# Patient Record
Sex: MALE | Race: WHITE
[De-identification: names, ages, dates, MRNs, and addresses within clinical notes are randomized per-mention and may not be internally consistent; named-entity substitution may affect disease eponyms.]

---

## 2017-04-21 ENCOUNTER — HOSPITAL ENCOUNTER (OUTPATIENT)
Dept: HOSPITAL 17 - PHRSP | Age: 67
End: 2017-04-21
Attending: INTERNAL MEDICINE
Payer: MEDICARE

## 2017-04-21 DIAGNOSIS — R06.89: Primary | ICD-10-CM

## 2017-04-21 PROCEDURE — 94060 EVALUATION OF WHEEZING: CPT

## 2017-04-21 PROCEDURE — 94726 PLETHYSMOGRAPHY LUNG VOLUMES: CPT

## 2017-04-21 PROCEDURE — 94729 DIFFUSING CAPACITY: CPT

## 2017-05-02 NOTE — RSPPFT
DATE OF PROCEDURE:    4/21/17



COMMENTS:   



Spirometry with FVC of 3.4, FEV1 of 2.4, FEV1/FVC ratio at 71%. A non-significant response 
to acutely inhaled bronchodilator. Slow vital capacity is 79% of predicted. TLC is 100%. 
Diffusion capacity is normal.   



IMPRESSION:    

   

1.    Mild airways obstruction.

2.    Non-significant response to acutely inhaled bronchodilator.

3.    No evidence of airways restriction.

4.    Normal diffusion capacity.

## 2017-05-03 ENCOUNTER — HOSPITAL ENCOUNTER (OUTPATIENT)
Dept: HOSPITAL 17 - HSDC | Age: 67
Discharge: HOME | End: 2017-05-03
Attending: INTERNAL MEDICINE
Payer: MEDICARE

## 2017-05-03 VITALS — WEIGHT: 136.47 LBS | HEIGHT: 70 IN | BODY MASS INDEX: 19.54 KG/M2

## 2017-05-03 VITALS
HEART RATE: 79 BPM | DIASTOLIC BLOOD PRESSURE: 68 MMHG | SYSTOLIC BLOOD PRESSURE: 101 MMHG | RESPIRATION RATE: 18 BRPM | OXYGEN SATURATION: 95 % | TEMPERATURE: 98 F

## 2017-05-03 VITALS
HEART RATE: 56 BPM | RESPIRATION RATE: 16 BRPM | DIASTOLIC BLOOD PRESSURE: 75 MMHG | TEMPERATURE: 98.4 F | OXYGEN SATURATION: 96 % | SYSTOLIC BLOOD PRESSURE: 127 MMHG

## 2017-05-03 DIAGNOSIS — J40: ICD-10-CM

## 2017-05-03 DIAGNOSIS — R91.8: Primary | ICD-10-CM

## 2017-05-03 DIAGNOSIS — Z01.810: ICD-10-CM

## 2017-05-03 DIAGNOSIS — Z01.818: ICD-10-CM

## 2017-05-03 LAB
APTT BLD: 27.1 SEC (ref 24.3–30.1)
BASOPHILS # BLD AUTO: 0 TH/MM3 (ref 0–0.2)
BASOPHILS NFR BLD: 0.6 % (ref 0–2)
EOSINOPHIL # BLD: 0.1 TH/MM3 (ref 0–0.4)
EOSINOPHIL NFR BLD: 1.9 % (ref 0–4)
ERYTHROCYTE [DISTWIDTH] IN BLOOD BY AUTOMATED COUNT: 12.9 % (ref 11.6–17.2)
HCT VFR BLD CALC: 43.5 % (ref 39–51)
HEMO FLAGS: (no result)
INR PPP: 1 RATIO
LYMPHOCYTES # BLD AUTO: 1.4 TH/MM3 (ref 1–4.8)
LYMPHOCYTES NFR BLD AUTO: 33.1 % (ref 9–44)
MCH RBC QN AUTO: 30.8 PG (ref 27–34)
MCHC RBC AUTO-ENTMCNC: 33.4 % (ref 32–36)
MCV RBC AUTO: 92.2 FL (ref 80–100)
MONOCYTES NFR BLD: 11.4 % (ref 0–8)
NEUTROPHILS # BLD AUTO: 2.3 TH/MM3 (ref 1.8–7.7)
NEUTROPHILS NFR BLD AUTO: 53 % (ref 16–70)
PLATELET # BLD: 212 TH/MM3 (ref 150–450)
PROTHROMBIN TIME: 10.8 SEC (ref 9.8–11.6)
RBC # BLD AUTO: 4.72 MIL/MM3 (ref 4.5–5.9)
WBC # BLD AUTO: 4.3 TH/MM3 (ref 4–11)

## 2017-05-03 PROCEDURE — 87186 SC STD MICRODIL/AGAR DIL: CPT

## 2017-05-03 PROCEDURE — 85730 THROMBOPLASTIN TIME PARTIAL: CPT

## 2017-05-03 PROCEDURE — 88305 TISSUE EXAM BY PATHOLOGIST: CPT

## 2017-05-03 PROCEDURE — 87206 SMEAR FLUORESCENT/ACID STAI: CPT

## 2017-05-03 PROCEDURE — 88312 SPECIAL STAINS GROUP 1: CPT

## 2017-05-03 PROCEDURE — 87205 SMEAR GRAM STAIN: CPT

## 2017-05-03 PROCEDURE — 31623 DX BRONCHOSCOPE/BRUSH: CPT

## 2017-05-03 PROCEDURE — 87015 SPECIMEN INFECT AGNT CONCNTJ: CPT

## 2017-05-03 PROCEDURE — 87071 CULTURE AEROBIC QUANT OTHER: CPT

## 2017-05-03 PROCEDURE — 87070 CULTURE OTHR SPECIMN AEROBIC: CPT

## 2017-05-03 PROCEDURE — 85025 COMPLETE CBC W/AUTO DIFF WBC: CPT

## 2017-05-03 PROCEDURE — 87116 MYCOBACTERIA CULTURE: CPT

## 2017-05-03 PROCEDURE — 85610 PROTHROMBIN TIME: CPT

## 2017-05-03 PROCEDURE — 87102 FUNGUS ISOLATION CULTURE: CPT

## 2017-05-03 PROCEDURE — 92950 HEART/LUNG RESUSCITATION CPR: CPT

## 2017-05-03 PROCEDURE — 00520 ANES CLOSED CHEST PX NOS: CPT

## 2017-05-03 PROCEDURE — 87077 CULTURE AEROBIC IDENTIFY: CPT

## 2017-05-03 PROCEDURE — 88112 CYTOPATH CELL ENHANCE TECH: CPT

## 2017-05-03 PROCEDURE — 93005 ELECTROCARDIOGRAM TRACING: CPT

## 2017-05-03 NOTE — MR
cc:

AUSTIN AVILA M.D.

****

 

 

DATE

5/3/2017

 

PROCEDURE PERFORMED

Fiberoptic bronchoscopy

 

PROCEDURE

Fiberoptic bronchoscopy performed via LMA.  Vocal cords intact.  Trachea

moderately hyperemic.  Genny sharp.  Thick mucoid whitish secretion throughout

the tracheobronchial tree noted, all removed.  Washings obtained from both the

side of the tracheobronchial tree for routine TB and fungal cultures, as well

as cytological exam.  The right upper, middle and lower lobe were inspected.

The sites of previous right middle lobectomy noted.  Cytologic brushing and

microbiology brushings obtained from the right lower lung lobe.  The left main

bronchus was patent.  Left upper and lower lobes without obstructive pathology

or mass lesion.  Washings as above obtained.  Cytologic brushings, microbiology

brushings, left lower lobe obtained.  Procedure well tolerated.  The patient

transferred to recovery in stable condition.

 

IMPRESSION

1.   Moderate tracheobronchitis

2.   Excess mucoid secretion

3.   Samples obtained as above

4.   Procedure well tolerated.

5.   The patient transferred recovery in stable condition.

 

 

 

                              _________________________________

                              Austin Avila MD

 

 

 

WWW/ROMI

D:  5/3/2017/8:39 AM

T:  5/3/2017/8:45 AM

Visit #:  [id number]

Job #:  [id number]

## 2017-05-03 NOTE — EKG
Date Performed: 05/03/2017       Time Performed: 06:19:18

 

PTAGE:      66 years

 

EKG:      SINUS BRADYCARDIA BORDERLINE ECG

 

PREVIOUS TRACING       : 04/20/2010 10.39

 

DOCTOR:   Jacinto Bustamante  Interpretating Date/Time  05/03/2017 08:28:28

## 2018-05-11 ENCOUNTER — HOSPITAL ENCOUNTER (OUTPATIENT)
Dept: HOSPITAL 17 - HROP | Age: 68
Discharge: HOME | End: 2018-05-11
Attending: INTERNAL MEDICINE
Payer: MEDICARE

## 2018-05-11 VITALS
RESPIRATION RATE: 20 BRPM | OXYGEN SATURATION: 97 % | SYSTOLIC BLOOD PRESSURE: 128 MMHG | HEART RATE: 57 BPM | DIASTOLIC BLOOD PRESSURE: 73 MMHG | TEMPERATURE: 97.4 F

## 2018-05-11 VITALS
DIASTOLIC BLOOD PRESSURE: 70 MMHG | OXYGEN SATURATION: 99 % | HEART RATE: 51 BPM | SYSTOLIC BLOOD PRESSURE: 138 MMHG | RESPIRATION RATE: 20 BRPM

## 2018-05-11 VITALS
HEART RATE: 55 BPM | SYSTOLIC BLOOD PRESSURE: 139 MMHG | DIASTOLIC BLOOD PRESSURE: 68 MMHG | OXYGEN SATURATION: 97 % | RESPIRATION RATE: 20 BRPM

## 2018-05-11 VITALS
HEART RATE: 54 BPM | SYSTOLIC BLOOD PRESSURE: 148 MMHG | TEMPERATURE: 97.6 F | DIASTOLIC BLOOD PRESSURE: 81 MMHG | RESPIRATION RATE: 18 BRPM | OXYGEN SATURATION: 99 %

## 2018-05-11 VITALS
SYSTOLIC BLOOD PRESSURE: 136 MMHG | HEART RATE: 55 BPM | RESPIRATION RATE: 20 BRPM | OXYGEN SATURATION: 100 % | DIASTOLIC BLOOD PRESSURE: 69 MMHG

## 2018-05-11 VITALS — HEIGHT: 70 IN | WEIGHT: 141.32 LBS | BODY MASS INDEX: 20.23 KG/M2

## 2018-05-11 DIAGNOSIS — A15.0: Primary | ICD-10-CM

## 2018-05-11 DIAGNOSIS — I10: ICD-10-CM

## 2018-05-11 DIAGNOSIS — I48.91: ICD-10-CM

## 2018-05-11 DIAGNOSIS — J44.9: ICD-10-CM

## 2018-05-11 LAB
BASOPHILS # BLD AUTO: 0 TH/MM3 (ref 0–0.2)
BASOPHILS NFR BLD: 0.5 % (ref 0–2)
EOSINOPHIL # BLD: 0 TH/MM3 (ref 0–0.4)
EOSINOPHIL NFR BLD: 0.9 % (ref 0–4)
ERYTHROCYTE [DISTWIDTH] IN BLOOD BY AUTOMATED COUNT: 12.7 % (ref 11.6–17.2)
HCT VFR BLD CALC: 43.9 % (ref 39–51)
HGB BLD-MCNC: 15.1 GM/DL (ref 13–17)
LYMPHOCYTES # BLD AUTO: 1.3 TH/MM3 (ref 1–4.8)
LYMPHOCYTES NFR BLD AUTO: 33.9 % (ref 9–44)
MCH RBC QN AUTO: 32.3 PG (ref 27–34)
MCHC RBC AUTO-ENTMCNC: 34.5 % (ref 32–36)
MCV RBC AUTO: 93.6 FL (ref 80–100)
MONOCYTE #: 0.4 TH/MM3 (ref 0–0.9)
MONOCYTES NFR BLD: 11.1 % (ref 0–8)
NEUTROPHILS # BLD AUTO: 2 TH/MM3 (ref 1.8–7.7)
NEUTROPHILS NFR BLD AUTO: 53.6 % (ref 16–70)
PLATELET # BLD: 192 TH/MM3 (ref 150–450)
PMV BLD AUTO: 6.6 FL (ref 7–11)
RBC # BLD AUTO: 4.69 MIL/MM3 (ref 4.5–5.9)
WBC # BLD AUTO: 3.8 TH/MM3 (ref 4–11)

## 2018-05-11 PROCEDURE — 00520 ANES CLOSED CHEST PX NOS: CPT

## 2018-05-11 PROCEDURE — 93005 ELECTROCARDIOGRAM TRACING: CPT

## 2018-05-11 PROCEDURE — 85025 COMPLETE CBC W/AUTO DIFF WBC: CPT

## 2018-05-11 PROCEDURE — 31622 DX BRONCHOSCOPE/WASH: CPT

## 2018-05-11 NOTE — MP
cc:

Austin Avila MD

****

 

 

DATE OF OPERATION:

05/11/2018

 

PROCEDURE PERFORMED:

Fibrotic bronchoscopy, flexible

 

REASON FOR BRONCHOSCOPY:

Atypical pulmonary TB (JESSICA).

 

DESCRIPTION OF PROCEDURE:

Fiberoptic bronchoscopy performed via LMA.  Vocal cords intact.  

Trachea diffusely hyperemic.  Right upper, middle, lower lobes, left 

upper and lower lobes were all inspected.  No mass lesion identified. 

Washings obtained from both sides of the tracheobronchial tree, which 

was with some mucus plugging and cloudy.  10 mL were collected in a 

tube, given to the patient to provide to [facility] in 

[place] per the patient's request.   Procedure very well tolerated.  

The patient was transferred to the recovery in stable condition.  No 

further samples were sent.

 

 

__________________________________

Austin Avila MD

 

 

WWW/NIKKY

D: 05/11/2018, 08:28 AM

T: 05/11/2018, 08:41 AM

Visit #: [id number]

Job #: [id number]

## 2018-05-12 NOTE — EKG
Date Performed: 05/11/2018       Time Performed: 07:27:01

 

PTAGE:      67 years

 

EKG:      SINUS BRADYCARDIA POSSIBLE SEPTAL MYOCARDIAL INFARCTION , OF INDETERMINATE AGE ABNORMAL ECG


 

PREVIOUS TRACING       : 05/03/2017 06.19

 

DOCTOR:   Hiram Rowland  Interpretating Date/Time  05/12/2018 08:20:29